# Patient Record
Sex: MALE
[De-identification: names, ages, dates, MRNs, and addresses within clinical notes are randomized per-mention and may not be internally consistent; named-entity substitution may affect disease eponyms.]

---

## 2020-09-16 ENCOUNTER — HOSPITAL ENCOUNTER (OUTPATIENT)
Dept: HOSPITAL 5 - CARD | Age: 50
Discharge: HOME | End: 2020-09-16
Attending: INTERNAL MEDICINE
Payer: COMMERCIAL

## 2020-09-16 DIAGNOSIS — R06.02: ICD-10-CM

## 2020-09-16 DIAGNOSIS — Z86.39: ICD-10-CM

## 2020-09-16 DIAGNOSIS — H40.9: ICD-10-CM

## 2020-09-16 DIAGNOSIS — R07.9: Primary | ICD-10-CM

## 2020-09-16 DIAGNOSIS — E11.9: ICD-10-CM

## 2020-09-16 PROCEDURE — 93017 CV STRESS TEST TRACING ONLY: CPT

## 2020-09-16 NOTE — TREADMILL REPORT
PROCEDURE:  This being done on 49-year-old gentleman for evaluation of chest

pains.  Baseline EKG showed sinus rhythm at a rate of 79 beats per minute,

within normal limits.  The patient exercised for 10 minutes on standard [employer]

protocol.  Attained heart rate of 173 beats per minute, which is 100% of target

heart rate.  Test was stopped due to fatigue and shortness of breath.  Did not

have any chest pain.  No EKG changes were noted to suggest ischemia.  Blood

pressure response is appropriate.  No arrhythmias were noted.



FINAL IMPRESSION:

1.  Very good exercise tolerance.

2.  Negative for angina, negative for ischemia.

3.  Appropriate blood pressure response.

4.  No arrhythmia noted.





DD: 09/16/2020 12:01

DT: 09/16/2020 13:30

JOB# [id number]  [id number]

DRK/EMILY